# Patient Record
Sex: FEMALE | Race: WHITE | NOT HISPANIC OR LATINO | Employment: OTHER | URBAN - METROPOLITAN AREA
[De-identification: names, ages, dates, MRNs, and addresses within clinical notes are randomized per-mention and may not be internally consistent; named-entity substitution may affect disease eponyms.]

---

## 2017-11-29 ENCOUNTER — OFFICE VISIT (OUTPATIENT)
Dept: FAMILY MEDICINE | Facility: CLINIC | Age: 22
End: 2017-11-29

## 2017-11-29 VITALS
HEIGHT: 68 IN | SYSTOLIC BLOOD PRESSURE: 108 MMHG | TEMPERATURE: 98.6 F | DIASTOLIC BLOOD PRESSURE: 70 MMHG | HEART RATE: 90 BPM | OXYGEN SATURATION: 99 % | BODY MASS INDEX: 22.07 KG/M2 | WEIGHT: 145.6 LBS

## 2017-11-29 DIAGNOSIS — H61.21 IMPACTED CERUMEN OF RIGHT EAR: Primary | ICD-10-CM

## 2017-11-29 PROBLEM — Z76.89 HEALTH CARE HOME: Status: ACTIVE | Noted: 2017-11-29

## 2017-11-29 PROCEDURE — 99202 OFFICE O/P NEW SF 15 MIN: CPT | Performed by: PHYSICIAN ASSISTANT

## 2017-11-29 NOTE — MR AVS SNAPSHOT
After Visit Summary   11/29/2017    Emily Joan Schwab    MRN: [id number]           Patient Information     Date Of Birth          1995        Visit Information        Provider Department      11/29/2017 10:30 AM Joy Villa PA [facility]        Today's Diagnoses     Impacted cerumen of right ear    -  1       Follow-ups after your visit        Additional Services     OTOLARYNGOLOGY REFERRAL       Your provider has referred you to: N: [facility] [phone]   [email or web address]    Please be aware that coverage of these services is subject to the terms and limitations of your health insurance plan.  Call member services at your health plan with any benefit or coverage questions.      Please bring the following to your appointment:  >>   Any x-rays, CTs or MRIs which have been performed.  Contact the facility where they were done to arrange for  prior to your scheduled appointment.  Any new CT, MRI or other procedures ordered by your specialist must be performed at a [facility] or coordinated by your clinic's referral office.    >>   List of current medications   >>   This referral request   >>   Any documents/labs given to you for this referral                  Who to contact     If you have questions or need follow up information about today's clinic visit or your schedule please contact [facility] directly at [phone].  Normal or non-critical lab and imaging results will be communicated to you by MyChart, letter or phone within 4 business days after the clinic has received the results. If you do not hear from us within 7 days, please contact the clinic through MyChart or phone. If you have a critical or abnormal lab result, we will notify you by phone as soon as possible.  Submit refill requests through [employer] or call your pharmacy and they will forward the refill  "request to us. Please allow 3 business days for your refill to be completed.          Additional Information About Your Visit        MyChart Information     [employer] gives you secure access to your electronic health record. If you see a primary care provider, you can also send messages to your care team and make appointments. If you have questions, please call your primary care clinic.  If you do not have a primary care provider, please call [phone] and they will assist you.        Care EveryWhere ID     This is your Care EveryWhere ID. This could be used by other organizations to access your [place] medical records  [id number]        Your Vitals Were     Pulse Temperature Height Last Period Pulse Oximetry Breastfeeding?    90 98.6  F (37  C) (Oral) 1.715 m (5' 7.5\") 10/29/2017 99% No    BMI (Body Mass Index)                   22.47 kg/m2            Blood Pressure from Last 3 Encounters:   11/29/17 108/70    Weight from Last 3 Encounters:   11/29/17 66 kg (145 lb 9.6 oz)              We Performed the Following     OTOLARYNGOLOGY REFERRAL        Primary Care Provider Office Phone # Fax #    ZAINAB Rajput [phone]       [address]  [facility] [address]        Equal Access to Services     GARETH CASTREJON : Hadii aad ku hadasho Soomaali, waaxda luqadaha, qaybta kaalmada adeegyada, waxay idiin hayaan adeeg khsinai la'crista . So [facility] [phone].    ATENCIÓN: Si habla español, tiene a pathak disposición servicios gratuitos de asistencia lingüística. Llame al [phone].    We comply with applicable federal civil rights laws and [place] laws. We do not discriminate on the basis of race, color, national origin, age, disability, sex, sexual orientation, or gender identity.            Thank you!     Thank you for choosing [facility]  for your care. Our goal is always to provide you with excellent care. Hearing back from our patients is one way we can " continue to improve our services. Please take a few minutes to complete the written survey that you may receive in the mail after your visit with us. Thank you!             Your Updated Medication List - Protect others around you: Learn how to safely use, store and throw away your medicines at www.disposemymeds.org.      Notice  As of 11/29/2017 11:59 PM    You have not been prescribed any medications.

## 2017-11-29 NOTE — PROGRESS NOTES
"SUBJECTIVE:                                                    Emily Joan Schwab is a 22 year old female who presents to clinic today for the following health issues:    Lena presents with 2 weeks of right ear pain. She does have a hx of cerumen impaction in the past.  She has been using hydrogen peroxide and debrox. She notes decreased hearing in the ear. Denies discharge other than wax.  She has had slight rhinorrhea and sore throat but denies SOB, cough, fevers.      Pt is new to our clinic.    I have reviewed the following histories: Past Medical History, Past Surgical History, Social History, Family History, Problem List, Medication List and Allergies                BP Readings from Last 3 Encounters:   11/29/17 108/70    Wt Readings from Last 3 Encounters:   11/29/17 66 kg (145 lb 9.6 oz)            Patient Active Problem List   Diagnosis     Health Care Home     Past Surgical History:   Procedure Laterality Date     CA ANESTH,LOWER ARM SURGERY Left 2007    left elbow - trampoline accident     DENTAL SURGERY         Social History   Substance Use Topics     Smoking status: Never Smoker     Smokeless tobacco: Never Used     Alcohol use No     No family history on file.      No current outpatient prescriptions on file.       No Known Allergies    OBJECTIVE:                                                    /70 (BP Location: Left arm, Patient Position: Chair, Cuff Size: Adult Regular)  Pulse 90  Temp 98.6  F (37  C) (Oral)  Ht 1.715 m (5' 7.5\")  Wt 66 kg (145 lb 9.6 oz)  LMP 10/29/2017  SpO2 99%  Breastfeeding? No  BMI 22.47 kg/m2 Body mass index is 22.47 kg/(m^2).     GENERAL: alert and no distress  HEAD: Normocephalic, atraumatic  EYES: Eyes grossly normal to inspection, extraocular movements - intact  EARS:   Right: External ear normal.  Cerumen impacted    Hydrogen peroxide soak for 5 min  Ear flushed     Able to remove some but not all cerumen  TM not visualized    Left: External ear and " canal normal, TM normal  NOSE: No discharge noted  MOUTH/THROAT: no ulcers, no lesions, pharynx non-erythematous, no exudates present, tonsils without hypertrophy, mucous membranes moist.   NECK: no tenderness, no adenopathy, no asymmetry, no masses, no stiffness; thyroid- normal to palpation  RESP: lungs clear to auscultation - no crackles, no rhonchi, no wheezes  CV: regular rates and rhythm, normal S1 S2, no S3 or S4 and no murmur, no click or rub -         ASSESSMENT/PLAN:                                                        ICD-10-CM    1. Impacted cerumen of right ear H61.21 OTOLARYNGOLOGY REFERRAL     Debrox and Hydrogen Peroxide daily  ENT consult advised    Joy Villa PA-C  [facility]

## 2017-11-29 NOTE — NURSING NOTE
Lena has been having ear pain on her right side for a few weeks. Pt states her ear is plugged and the pain moves into her jaw area    Pre-Visit Screening :  Immunizations : not up to date - postponed flu shot  Asthma Action Test/Plan : NA  PHQ9/GAD7 :  NA    Pulse - regular  My Chart - accepts    CLASSIFICATION OF OVERWEIGHT AND OBESITY BY BMI                         Obesity Class           BMI(kg/m2)  Underweight                                    < 18.5  Normal                                         18.5-24.9  Overweight                                     25.0-29.9  OBESITY                     I                  30.0-34.9                              II                 35.0-39.9  EXTREME OBESITY             III                >40                             Patient's  BMI Body mass index is 22.47 kg/(m^2).  http://hin.nhlbi.nih.gov/menuplanner/menu.cgi  Questioned patient about current smoking habits.  Pt. has never smoked.  The patient has verbalized that it is ok to leave a detailed voice message on the patient's cell phone with results/recommendations from this visit.

## 2017-12-02 ENCOUNTER — HEALTH MAINTENANCE LETTER (OUTPATIENT)
Age: 22
End: 2017-12-02

## 2018-07-21 ENCOUNTER — HEALTH MAINTENANCE LETTER (OUTPATIENT)
Age: 23
End: 2018-07-21

## 2019-12-15 ENCOUNTER — HEALTH MAINTENANCE LETTER (OUTPATIENT)
Age: 24
End: 2019-12-15

## 2021-01-15 ENCOUNTER — HEALTH MAINTENANCE LETTER (OUTPATIENT)
Age: 26
End: 2021-01-15

## 2021-09-04 ENCOUNTER — HEALTH MAINTENANCE LETTER (OUTPATIENT)
Age: 26
End: 2021-09-04

## 2022-02-19 ENCOUNTER — HEALTH MAINTENANCE LETTER (OUTPATIENT)
Age: 27
End: 2022-02-19

## 2022-03-29 ENCOUNTER — TRANSFERRED RECORDS (OUTPATIENT)
Dept: HEALTH INFORMATION MANAGEMENT | Facility: CLINIC | Age: 27
End: 2022-03-29
Payer: COMMERCIAL

## 2022-04-11 ENCOUNTER — TRANSFERRED RECORDS (OUTPATIENT)
Dept: HEALTH INFORMATION MANAGEMENT | Facility: CLINIC | Age: 27
End: 2022-04-11

## 2022-04-11 ENCOUNTER — MEDICAL CORRESPONDENCE (OUTPATIENT)
Dept: HEALTH INFORMATION MANAGEMENT | Facility: CLINIC | Age: 27
End: 2022-04-11

## 2022-04-12 ENCOUNTER — TRANSCRIBE ORDERS (OUTPATIENT)
Dept: MATERNAL FETAL MEDICINE | Facility: CLINIC | Age: 27
End: 2022-04-12

## 2022-04-12 DIAGNOSIS — O26.90 PREGNANCY RELATED CONDITION: Primary | ICD-10-CM

## 2022-04-14 ENCOUNTER — TRANSCRIBE ORDERS (OUTPATIENT)
Dept: MATERNAL FETAL MEDICINE | Facility: CLINIC | Age: 27
End: 2022-04-14

## 2022-04-14 DIAGNOSIS — O09.90 HIGH-RISK PREGNANCY, UNSPECIFIED TRIMESTER: Primary | ICD-10-CM

## 2022-04-14 DIAGNOSIS — O26.90 PREGNANCY RELATED CONDITION, ANTEPARTUM: Primary | ICD-10-CM

## 2022-05-03 ENCOUNTER — PRE VISIT (OUTPATIENT)
Dept: MATERNAL FETAL MEDICINE | Facility: CLINIC | Age: 27
End: 2022-05-03
Payer: COMMERCIAL

## 2022-05-13 ENCOUNTER — HOSPITAL ENCOUNTER (OUTPATIENT)
Dept: ULTRASOUND IMAGING | Facility: CLINIC | Age: 27
Discharge: HOME | End: 2022-05-13
Attending: OBSTETRICS & GYNECOLOGY
Payer: COMMERCIAL

## 2022-05-13 ENCOUNTER — OFFICE VISIT (OUTPATIENT)
Dept: MATERNAL FETAL MEDICINE | Facility: CLINIC | Age: 27
End: 2022-05-13
Attending: OBSTETRICS & GYNECOLOGY
Payer: COMMERCIAL

## 2022-05-13 DIAGNOSIS — O09.90 HIGH-RISK PREGNANCY, UNSPECIFIED TRIMESTER: ICD-10-CM

## 2022-05-13 DIAGNOSIS — O26.90 PREGNANCY RELATED CONDITION, ANTEPARTUM: ICD-10-CM

## 2022-05-13 DIAGNOSIS — Z31.5 ENCOUNTER FOR PROCREATIVE GENETIC COUNSELING: Primary | ICD-10-CM

## 2022-05-13 DIAGNOSIS — Q79.60 EHLERS-DANLOS SYNDROME: ICD-10-CM

## 2022-05-13 PROCEDURE — 76813 OB US NUCHAL MEAS 1 GEST: CPT | Mod: 26 | Performed by: OBSTETRICS & GYNECOLOGY

## 2022-05-13 PROCEDURE — 96040 HC GENETIC COUNSELING, EACH 30 MINUTES: CPT | Performed by: GENETIC COUNSELOR, MS

## 2022-05-13 PROCEDURE — 76813 OB US NUCHAL MEAS 1 GEST: CPT

## 2022-05-13 PROCEDURE — 99203 OFFICE O/P NEW LOW 30 MIN: CPT | Mod: 25 | Performed by: OBSTETRICS & GYNECOLOGY

## 2022-05-13 NOTE — PROGRESS NOTES
"Please see \"Imaging\" tab under \"Chart Review\" for details of today's visit.    Nathaly Ventura MD PhD  Maternal Fetal Medicine     "

## 2022-05-13 NOTE — NURSING NOTE
Lena presents to [facility] for GC, NT, and MFM consult due to suspected EDS. Dr. Ventura met with patient. Pt is scheduled for L2 on 6/23/22.     Nathaly Ruiz RN

## 2022-05-13 NOTE — PROGRESS NOTES
[facility]  Genetic Counseling Consult    Patient: Lena Craft YOB: 1995   Date of Service: 22      Lena Craft was seen at [facility] for genetic consultation to discuss the options for screening and testing for fetal chromosome abnormalities.  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        Impression/Plan:   1.  Lena had a genetic counseling session today do discuss her personal and family medical histories of Dena Danlos Syndrome.  Lena's personal and family history of chronic joint pain and laxity may be consistent with EDS, in particular hypermobile EDS.  We discussed that genetic testing cannot identify an underlying cause for all types of EDS.      2.  Lena had an MFM consult today to discuss recommendations for prenatal management based on her personal medical history, please see corresponding documentation for complete details.  Because of the concern for severe/life threatening cardiac complications with certain types of EDS, a maternal echocardiogram may be considered.    3.  Lena declines screening for aneuploidy or other genetic conditions, and understands this remains available throughout pregnancy if desired.    Pregnancy History:   /Parity:    Age at Delivery: 27 year old  MONIE: 2022, by Ultrasound  Gestational Age: 13w4d    No significant complications or exposures were reported in the current pregnancy.    Lena s pregnancy history is significant for 1 prior first trimester SAB with no known cause.    Medical History:   Lena s reported medical history includes Dena Danlos syndrome (EDS).  Lena understands that today's appointment is not intended to confirm or rule out a diagnosis.  Lena's symptoms are per her report only and a physical exam was not a part of this appointment.  Lena reports  "significant joint laxity as well as chronic joint pain, both present since early childhood.  She reports multiple occurrences of rolled/sprained ankles, but denies any history of dislocations requiring medical care. Lena reports that her symptoms improve with periods of healthy life style/weight loss, and are exacerbated by weight gain.  She reports lax/stretchy skin, easy bruising, and lifelong fatigue as well.      Dena-Danlos syndrome (EDS) is a group of connective tissue disorders that affect the skin, bones, and blood vessels of many organs and tissues. There are 13 different types of EDS with six of the types being more common and the remaining increasingly rare. A few symptoms are common among most types of EDS including hypermobility and elastic and fragile skin. Further symptoms are unique or more common in some types. In the arthrochalasia type, for example, infants have hypermobility, hypotonia, and dislocations of hips at birth. Individuals with the classic type often have poor wound healing and have characteristic \"cigarette paper\" scars. Lastly, in the vascular type there is an increased risk for blood vessel rupture leading to life-threatening complications, especially during pregnancy when organ rupture is possible. Individuals with EDS often report chronic pain and systemic health problems like gastrointestinal issues and joint pain.     Mutations in 19 genes have been identified to cause the various Dena-Danlos syndromes. Most cases of the vascular type result from mutations in the COL3A1 gene, and rarely the COL1A1 gene. Genetic testing for the hypermobile type typically has low yield. Therefore, it is common for the hypermobile type to be clinically diagnosed. Treatment and management of the condition largely depends on the type and complications. Some individuals may need comprehensive care over multiple specialities.     The inheritance of EDS depends on the type. The classical, vascular, " arthrochalasia, periodontal, and likely the hypermobile type typically have an autosomal dominant pattern of inheritance. This means an affected individual has one copy of the causative gene with a mutation. An affected parent of an autosomal dominant condition has a 50% chance of passing on the gene to their child. Other types like the classic-like, cardiac-valvular, dermatosparaxis, kyphoscoliotic, spondylodysplastic, and musculocontractural types have an autosomal recessive pattern of inheritance.      Lena understands that genetic testing specifically for hypermobile type EDS would be expected to have a low yield, as the underlying genetic causes of hypermobile type EDS are not completely understood.  We discussed that based on her personal and family history, there is as high as a 50% chance for her children to inherit EDS from her.  Without a molecular cause, genetic testing of the pregnancy is not an option, and Lena was encouraged to discuss EDS with her child's pediatrician to ensure adequate evaluation and care in childhood.      Because of the concern for severe/life threatening cardiac complications with certain types of EDS, a maternal echocardiogram may be considered.         Family History:   A three-generation pedigree was obtained, and is scanned under the  Media  tab.   The following significant findings were reported by Lena:    Lena reports that her family history includes multiple individuals with EDS, all with symptoms similar to hers, specifically chronic joint pain, for her oldest sister, father, and 1 paternal uncle.  Her family history is consistent with an autosomal dominant inheritance pattern, with no symptoms that would be specifically suggestive of vascular type EDS or classic EDS.        Otherwise, the reported family history is negative for multiple miscarriages, stillbirths, birth defects, cognitive impairment, known genetic conditions, and consanguinity.       Carrier  Screening:       Expanded carrier screening for mutations in a large panel of genes associated with autosomal recessive conditions including cystic fibrosis, spinal muscular atrophy, and others, is now available.      Carrier screening was not discussed today.       Risk Assessment for Chromosome Conditions:   We explained that the risk for fetal chromosome abnormalities increases with maternal age. We discussed specific features of common chromosome abnormalities, including Down syndrome, trisomy 13, trisomy 18, and sex chromosome trisomies.      - At age 26 at midtrimester, the risk to have a baby with Down syndrome is 1 in 1087.    - At age 26 at midtrimester, the risk to have a baby with any chromosome abnormality is 1 in 544.     Lena has discussed aneuploidy and genetic screening with her primary OB already.  We briefly reviewed the topic today and Lena confirmed her decision to decline screening.       Testing Options:   We discussed the following options:   Non-invasive Prenatal Testing (NIPT)    Maternal plasma cell-free DNA testing; first trimester ultrasound with nuchal translucency and nasal bone assessment is recommended, when appropriate    Screens for fetal trisomy 21, trisomy 13, trisomy 18, and sex chromosome aneuploidy    Cannot screen for open neural tube defects; maternal serum AFP after 15 weeks is recommended       Genetic Amniocentesis    Invasive procedure typically performed in the second trimester by which amniotic fluid is obtained for the purpose of chromosome analysis and/or other prenatal genetic analysis    Diagnostic results; >99% sensitivity for fetal chromosome abnormalities    AFAFP measurement tests for open neural tube defects       Comprehensive (Level II) ultrasound: Detailed ultrasound performed between 18-22 weeks gestation to screen for major birth defects and markers for aneuploidy.        We reviewed the benefits and limitations of this testing.  Screening tests provide  a risk assessment specific to the pregnancy for certain fetal chromosome abnormalities, but cannot definitively diagnose or exclude a fetal chromosome abnormality.  Follow-up genetic counseling and consideration of diagnostic testing is recommended with any abnormal screening result.     Diagnostic tests carry inherent risks- including risk of miscarriage- that require careful consideration.  These tests can detect fetal chromosome abnormalities with greater than 99% certainty.  Results can be compromised by maternal cell contamination or mosaicism, and are limited by the resolution of cytogenetic G-banding technology.  There is no screening nor diagnostic test that can detect all forms of birth defects or mental disability.     It was a pleasure to be involved with [facility]. Face-to-face time of the meeting was 35 minutes.      Linus Pineda MS, [facility]  Licensed Genetic Counselor  Phone: [phone]  Pager: [phone]

## 2022-05-16 ENCOUNTER — TELEPHONE (OUTPATIENT)
Dept: INTERVENTIONAL RADIOLOGY/VASCULAR | Facility: CLINIC | Age: 27
End: 2022-05-16
Payer: COMMERCIAL

## 2022-06-20 ENCOUNTER — HOSPITAL ENCOUNTER (OUTPATIENT)
Dept: ULTRASOUND IMAGING | Facility: CLINIC | Age: 27
Discharge: HOME | End: 2022-06-20
Attending: OBSTETRICS & GYNECOLOGY | Admitting: OBSTETRICS & GYNECOLOGY
Payer: COMMERCIAL

## 2022-06-20 ENCOUNTER — OFFICE VISIT (OUTPATIENT)
Dept: MATERNAL FETAL MEDICINE | Facility: CLINIC | Age: 27
End: 2022-06-20
Attending: OBSTETRICS & GYNECOLOGY
Payer: COMMERCIAL

## 2022-06-20 ENCOUNTER — HOSPITAL ENCOUNTER (OUTPATIENT)
Dept: ULTRASOUND IMAGING | Facility: CLINIC | Age: 27
Discharge: HOME | End: 2022-06-20
Attending: OBSTETRICS & GYNECOLOGY
Payer: COMMERCIAL

## 2022-06-20 DIAGNOSIS — R22.1 LOCALIZED SWELLING, MASS AND LUMP, NECK: ICD-10-CM

## 2022-06-20 DIAGNOSIS — O09.90 HIGH-RISK PREGNANCY, UNSPECIFIED TRIMESTER: Primary | ICD-10-CM

## 2022-06-20 DIAGNOSIS — O09.90 HIGH-RISK PREGNANCY, UNSPECIFIED TRIMESTER: ICD-10-CM

## 2022-06-20 PROCEDURE — 88184 FLOWCYTOMETRY/ TC 1 MARKER: CPT | Performed by: STUDENT IN AN ORGANIZED HEALTH CARE EDUCATION/TRAINING PROGRAM

## 2022-06-20 PROCEDURE — 76811 OB US DETAILED SNGL FETUS: CPT

## 2022-06-20 PROCEDURE — 272N000431 US BIOPSY HEAD NECK THORAX SOFT TISSUE

## 2022-06-20 PROCEDURE — 88184 FLOWCYTOMETRY/ TC 1 MARKER: CPT | Performed by: OBSTETRICS & GYNECOLOGY

## 2022-06-20 PROCEDURE — 88305 TISSUE EXAM BY PATHOLOGIST: CPT | Mod: TC | Performed by: OBSTETRICS & GYNECOLOGY

## 2022-06-20 PROCEDURE — 88189 FLOWCYTOMETRY/READ 16 & >: CPT | Mod: GC | Performed by: STUDENT IN AN ORGANIZED HEALTH CARE EDUCATION/TRAINING PROGRAM

## 2022-06-20 PROCEDURE — 88185 FLOWCYTOMETRY/TC ADD-ON: CPT | Performed by: OBSTETRICS & GYNECOLOGY

## 2022-06-20 PROCEDURE — 250N000009 HC RX 250: Performed by: RADIOLOGY

## 2022-06-20 PROCEDURE — 76811 OB US DETAILED SNGL FETUS: CPT | Mod: 26 | Performed by: OBSTETRICS & GYNECOLOGY

## 2022-06-20 RX ADMIN — LIDOCAINE HYDROCHLORIDE 10 ML: 10 INJECTION, SOLUTION EPIDURAL; INFILTRATION; INTRACAUDAL; PERINEURAL at 14:24

## 2022-06-20 NOTE — PROCEDURES
Interventional Radiology Post-Procedure Note   ?   Brief Procedure Note:   Patient name: Lena Craft  Pt MRN:[id number]   Date of procedure: 6/20/2022     Procedure(s): Image Guided Biopsy of right supraclavicular lymph node  Sedation method: Moderate sedation was employed. The patient was monitored by an interventional radiology nurse at all times throughout the procedure under my direct guidance.  Pre Procedure Diagnosis: Enlarged lymp node  Post Procedure Diagnosis: Same  Indications: Need for pathologic evaluation   ?   Attending: Krystian Alex M.D.  Specimen(s) removed: four 18g core needle biopsy specimens   Additional studies ordered: None  Drains: None   Estimated Blood Loss: Minimal  Complications: None  Vascular closure method: None   Findings/Notes/Comments: Uncomplicated biopsy of right supraclavicular lymph node.   ?   Please see dictation in PACS or under the Imaging tab in [facility] for detailed procedure note.     Krystian Alex M.D.   Vascular and Interventional Radiology   Pager: [phone]   After Hours / Scheduling: [phone]     6/20/2022  3:26 PM

## 2022-06-20 NOTE — PROGRESS NOTES
Please refer to ultrasound report under 'Imaging' Studies of 'Chart Review' tabs.    Brayan Jiménez M.D.

## 2022-06-22 LAB
PATH REPORT.COMMENTS IMP SPEC: NORMAL
PATH REPORT.FINAL DX SPEC: NORMAL
PATH REPORT.FINAL DX SPEC: NORMAL
PATH REPORT.GROSS SPEC: NORMAL
PATH REPORT.MICROSCOPIC SPEC OTHER STN: NORMAL
PATH REPORT.MICROSCOPIC SPEC OTHER STN: NORMAL
PATH REPORT.RELEVANT HX SPEC: NORMAL
PHOTO IMAGE: NORMAL

## 2022-06-22 PROCEDURE — 88341 IMHCHEM/IMCYTCHM EA ADD ANTB: CPT | Mod: 26 | Performed by: PATHOLOGY

## 2022-06-22 PROCEDURE — 88305 TISSUE EXAM BY PATHOLOGIST: CPT | Mod: 26 | Performed by: PATHOLOGY

## 2022-06-22 PROCEDURE — 88342 IMHCHEM/IMCYTCHM 1ST ANTB: CPT | Mod: 26 | Performed by: PATHOLOGY

## 2022-08-23 ENCOUNTER — LAB REQUISITION (OUTPATIENT)
Dept: LAB | Facility: CLINIC | Age: 27
End: 2022-08-23
Payer: COMMERCIAL

## 2022-08-23 DIAGNOSIS — Z36.89 ENCOUNTER FOR OTHER SPECIFIED ANTENATAL SCREENING: ICD-10-CM

## 2022-08-23 DIAGNOSIS — Z01.83 ENCOUNTER FOR BLOOD TYPING: ICD-10-CM

## 2022-08-23 LAB
GLUCOSE 1H P 50 G GLC PO SERPL-MCNC: 95 MG/DL (ref 70–129)
HGB BLD-MCNC: 11.4 G/DL (ref 11.7–15.7)

## 2022-08-23 PROCEDURE — 82950 GLUCOSE TEST: CPT | Mod: ORL | Performed by: OBSTETRICS & GYNECOLOGY

## 2022-08-23 PROCEDURE — 86850 RBC ANTIBODY SCREEN: CPT | Mod: ORL | Performed by: OBSTETRICS & GYNECOLOGY

## 2022-08-23 PROCEDURE — 85018 HEMOGLOBIN: CPT | Mod: ORL | Performed by: OBSTETRICS & GYNECOLOGY

## 2022-08-23 PROCEDURE — 86592 SYPHILIS TEST NON-TREP QUAL: CPT | Mod: ORL | Performed by: OBSTETRICS & GYNECOLOGY

## 2022-08-24 LAB
ANTIBODY SCREEN: NEGATIVE
RPR SER QL: NONREACTIVE
SPECIMEN EXPIRATION DATE: NORMAL

## 2022-10-16 ENCOUNTER — HEALTH MAINTENANCE LETTER (OUTPATIENT)
Age: 27
End: 2022-10-16

## 2022-10-18 ENCOUNTER — LAB REQUISITION (OUTPATIENT)
Dept: LAB | Facility: CLINIC | Age: 27
End: 2022-10-18

## 2022-10-18 DIAGNOSIS — Z3A.36 36 WEEKS GESTATION OF PREGNANCY: ICD-10-CM

## 2022-10-18 PROCEDURE — 87653 STREP B DNA AMP PROBE: CPT | Performed by: OBSTETRICS & GYNECOLOGY

## 2022-10-19 LAB — GP B STREP DNA SPEC QL NAA+PROBE: NEGATIVE

## 2022-11-19 ENCOUNTER — HOSPITAL ENCOUNTER (INPATIENT)
Facility: CLINIC | Age: 27
LOS: 4 days | Discharge: HOME | End: 2022-11-23
Attending: OBSTETRICS & GYNECOLOGY | Admitting: OBSTETRICS & GYNECOLOGY
Payer: COMMERCIAL

## 2022-11-19 DIAGNOSIS — Z37.9 VACUUM-ASSISTED VAGINAL DELIVERY: Primary | ICD-10-CM

## 2022-11-19 LAB
ABO/RH(D): NORMAL
ANTIBODY SCREEN: NEGATIVE
SPECIMEN EXPIRATION DATE: NORMAL

## 2022-11-19 PROCEDURE — 86780 TREPONEMA PALLIDUM: CPT | Performed by: OBSTETRICS & GYNECOLOGY

## 2022-11-19 PROCEDURE — 86850 RBC ANTIBODY SCREEN: CPT | Performed by: OBSTETRICS & GYNECOLOGY

## 2022-11-19 PROCEDURE — 120N000001 HC R&B MED SURG/OB

## 2022-11-19 PROCEDURE — 86901 BLOOD TYPING SEROLOGIC RH(D): CPT | Performed by: OBSTETRICS & GYNECOLOGY

## 2022-11-19 PROCEDURE — 250N000013 HC RX MED GY IP 250 OP 250 PS 637: Performed by: OBSTETRICS & GYNECOLOGY

## 2022-11-19 RX ORDER — NALOXONE HYDROCHLORIDE 0.4 MG/ML
0.4 INJECTION, SOLUTION INTRAMUSCULAR; INTRAVENOUS; SUBCUTANEOUS
Status: DISCONTINUED | OUTPATIENT
Start: 2022-11-19 | End: 2022-11-21 | Stop reason: HOSPADM

## 2022-11-19 RX ORDER — METHYLERGONOVINE MALEATE 0.2 MG/ML
200 INJECTION INTRAVENOUS
Status: DISCONTINUED | OUTPATIENT
Start: 2022-11-19 | End: 2022-11-21 | Stop reason: HOSPADM

## 2022-11-19 RX ORDER — ONDANSETRON 2 MG/ML
4 INJECTION INTRAMUSCULAR; INTRAVENOUS EVERY 6 HOURS PRN
Status: DISCONTINUED | OUTPATIENT
Start: 2022-11-19 | End: 2022-11-21 | Stop reason: HOSPADM

## 2022-11-19 RX ORDER — MISOPROSTOL 200 UG/1
400 TABLET ORAL
Status: DISCONTINUED | OUTPATIENT
Start: 2022-11-19 | End: 2022-11-21 | Stop reason: HOSPADM

## 2022-11-19 RX ORDER — NALOXONE HYDROCHLORIDE 0.4 MG/ML
0.2 INJECTION, SOLUTION INTRAMUSCULAR; INTRAVENOUS; SUBCUTANEOUS
Status: DISCONTINUED | OUTPATIENT
Start: 2022-11-19 | End: 2022-11-21 | Stop reason: HOSPADM

## 2022-11-19 RX ORDER — MISOPROSTOL 100 UG/1
25 TABLET ORAL
Status: DISCONTINUED | OUTPATIENT
Start: 2022-11-19 | End: 2022-11-21 | Stop reason: HOSPADM

## 2022-11-19 RX ORDER — KETOROLAC TROMETHAMINE 30 MG/ML
30 INJECTION, SOLUTION INTRAMUSCULAR; INTRAVENOUS
Status: DISCONTINUED | OUTPATIENT
Start: 2022-11-19 | End: 2022-11-21

## 2022-11-19 RX ORDER — IBUPROFEN 800 MG/1
800 TABLET, FILM COATED ORAL
Status: DISCONTINUED | OUTPATIENT
Start: 2022-11-19 | End: 2022-11-21

## 2022-11-19 RX ORDER — OXYTOCIN 10 [USP'U]/ML
10 INJECTION, SOLUTION INTRAMUSCULAR; INTRAVENOUS
Status: DISCONTINUED | OUTPATIENT
Start: 2022-11-19 | End: 2022-11-21

## 2022-11-19 RX ORDER — MISOPROSTOL 200 UG/1
800 TABLET ORAL
Status: DISCONTINUED | OUTPATIENT
Start: 2022-11-19 | End: 2022-11-21 | Stop reason: HOSPADM

## 2022-11-19 RX ORDER — METOCLOPRAMIDE HYDROCHLORIDE 5 MG/ML
10 INJECTION INTRAMUSCULAR; INTRAVENOUS EVERY 6 HOURS PRN
Status: DISCONTINUED | OUTPATIENT
Start: 2022-11-19 | End: 2022-11-21 | Stop reason: HOSPADM

## 2022-11-19 RX ORDER — ONDANSETRON 4 MG/1
4 TABLET, ORALLY DISINTEGRATING ORAL EVERY 6 HOURS PRN
Status: DISCONTINUED | OUTPATIENT
Start: 2022-11-19 | End: 2022-11-21 | Stop reason: HOSPADM

## 2022-11-19 RX ORDER — PROCHLORPERAZINE 25 MG
25 SUPPOSITORY, RECTAL RECTAL EVERY 12 HOURS PRN
Status: DISCONTINUED | OUTPATIENT
Start: 2022-11-19 | End: 2022-11-21 | Stop reason: HOSPADM

## 2022-11-19 RX ORDER — TERBUTALINE SULFATE 1 MG/ML
0.25 INJECTION, SOLUTION SUBCUTANEOUS
Status: DISCONTINUED | OUTPATIENT
Start: 2022-11-19 | End: 2022-11-21 | Stop reason: HOSPADM

## 2022-11-19 RX ORDER — TRANEXAMIC ACID 10 MG/ML
1 INJECTION, SOLUTION INTRAVENOUS EVERY 30 MIN PRN
Status: DISCONTINUED | OUTPATIENT
Start: 2022-11-19 | End: 2022-11-21 | Stop reason: HOSPADM

## 2022-11-19 RX ORDER — CARBOPROST TROMETHAMINE 250 UG/ML
250 INJECTION, SOLUTION INTRAMUSCULAR
Status: DISCONTINUED | OUTPATIENT
Start: 2022-11-19 | End: 2022-11-21 | Stop reason: HOSPADM

## 2022-11-19 RX ORDER — DOXYLAMINE SUCCINATE AND PYRIDOXINE HYDROCHLORIDE 20; 20 MG/1; MG/1
1 TABLET, EXTENDED RELEASE ORAL 2 TIMES DAILY
Status: ON HOLD | COMMUNITY
Start: 2022-04-15 | End: 2022-11-23

## 2022-11-19 RX ORDER — OXYTOCIN/0.9 % SODIUM CHLORIDE 30/500 ML
100-340 PLASTIC BAG, INJECTION (ML) INTRAVENOUS CONTINUOUS PRN
Status: DISCONTINUED | OUTPATIENT
Start: 2022-11-19 | End: 2022-11-21

## 2022-11-19 RX ORDER — OXYTOCIN 10 [USP'U]/ML
10 INJECTION, SOLUTION INTRAMUSCULAR; INTRAVENOUS
Status: DISCONTINUED | OUTPATIENT
Start: 2022-11-19 | End: 2022-11-21 | Stop reason: HOSPADM

## 2022-11-19 RX ORDER — PROCHLORPERAZINE MALEATE 10 MG
10 TABLET ORAL EVERY 6 HOURS PRN
Status: DISCONTINUED | OUTPATIENT
Start: 2022-11-19 | End: 2022-11-21 | Stop reason: HOSPADM

## 2022-11-19 RX ORDER — CITRIC ACID/SODIUM CITRATE 334-500MG
30 SOLUTION, ORAL ORAL
Status: DISCONTINUED | OUTPATIENT
Start: 2022-11-19 | End: 2022-11-21 | Stop reason: HOSPADM

## 2022-11-19 RX ORDER — OXYTOCIN/0.9 % SODIUM CHLORIDE 30/500 ML
340 PLASTIC BAG, INJECTION (ML) INTRAVENOUS CONTINUOUS PRN
Status: DISCONTINUED | OUTPATIENT
Start: 2022-11-19 | End: 2022-11-21 | Stop reason: HOSPADM

## 2022-11-19 RX ORDER — METOCLOPRAMIDE 10 MG/1
10 TABLET ORAL EVERY 6 HOURS PRN
Status: DISCONTINUED | OUTPATIENT
Start: 2022-11-19 | End: 2022-11-21 | Stop reason: HOSPADM

## 2022-11-19 RX ADMIN — MISOPROSTOL 25 MCG: 100 TABLET ORAL at 23:25

## 2022-11-19 ASSESSMENT — ACTIVITIES OF DAILY LIVING (ADL)
WEAR_GLASSES_OR_BLIND: NO
WALKING_OR_CLIMBING_STAIRS_DIFFICULTY: NO
CHANGE_IN_FUNCTIONAL_STATUS_SINCE_ONSET_OF_CURRENT_ILLNESS/INJURY: NO
FALL_HISTORY_WITHIN_LAST_SIX_MONTHS: NO
CONCENTRATING,_REMEMBERING_OR_MAKING_DECISIONS_DIFFICULTY: NO
ADLS_ACUITY_SCORE: 18
TOILETING_ISSUES: NO
DIFFICULTY_EATING/SWALLOWING: NO
DRESSING/BATHING_DIFFICULTY: NO
DOING_ERRANDS_INDEPENDENTLY_DIFFICULTY: NO

## 2022-11-20 ENCOUNTER — ANESTHESIA EVENT (OUTPATIENT)
Dept: OBGYN | Facility: CLINIC | Age: 27
End: 2022-11-20
Payer: COMMERCIAL

## 2022-11-20 ENCOUNTER — ANESTHESIA (OUTPATIENT)
Dept: OBGYN | Facility: CLINIC | Age: 27
End: 2022-11-20
Payer: COMMERCIAL

## 2022-11-20 LAB
HOLD SPECIMEN: NORMAL
SARS-COV-2 RNA RESP QL NAA+PROBE: NEGATIVE
T PALLIDUM AB SER QL: NONREACTIVE

## 2022-11-20 PROCEDURE — U0005 INFEC AGEN DETEC AMPLI PROBE: HCPCS | Performed by: OBSTETRICS & GYNECOLOGY

## 2022-11-20 PROCEDURE — 250N000011 HC RX IP 250 OP 636: Performed by: OBSTETRICS & GYNECOLOGY

## 2022-11-20 PROCEDURE — 3E0R3BZ INTRODUCTION OF ANESTHETIC AGENT INTO SPINAL CANAL, PERCUTANEOUS APPROACH: ICD-10-PCS | Performed by: ANESTHESIOLOGY

## 2022-11-20 PROCEDURE — 250N000009 HC RX 250: Performed by: OBSTETRICS & GYNECOLOGY

## 2022-11-20 PROCEDURE — 120N000001 HC R&B MED SURG/OB

## 2022-11-20 PROCEDURE — 250N000011 HC RX IP 250 OP 636: Performed by: ANESTHESIOLOGY

## 2022-11-20 PROCEDURE — 258N000003 HC RX IP 258 OP 636: Performed by: OBSTETRICS & GYNECOLOGY

## 2022-11-20 PROCEDURE — 00HU33Z INSERTION OF INFUSION DEVICE INTO SPINAL CANAL, PERCUTANEOUS APPROACH: ICD-10-PCS | Performed by: ANESTHESIOLOGY

## 2022-11-20 PROCEDURE — 250N000013 HC RX MED GY IP 250 OP 250 PS 637: Performed by: OBSTETRICS & GYNECOLOGY

## 2022-11-20 PROCEDURE — 370N000003 HC ANESTHESIA WARD SERVICE

## 2022-11-20 RX ORDER — OXYTOCIN 10 [USP'U]/ML
10 INJECTION, SOLUTION INTRAMUSCULAR; INTRAVENOUS
Status: DISCONTINUED | OUTPATIENT
Start: 2022-11-20 | End: 2022-11-21

## 2022-11-20 RX ORDER — NALOXONE HYDROCHLORIDE 0.4 MG/ML
0.4 INJECTION, SOLUTION INTRAMUSCULAR; INTRAVENOUS; SUBCUTANEOUS
Status: DISCONTINUED | OUTPATIENT
Start: 2022-11-20 | End: 2022-11-21 | Stop reason: HOSPADM

## 2022-11-20 RX ORDER — HYDROXYZINE HYDROCHLORIDE 25 MG/1
25 TABLET, FILM COATED ORAL EVERY 6 HOURS PRN
Status: COMPLETED | OUTPATIENT
Start: 2022-11-20 | End: 2022-11-20

## 2022-11-20 RX ORDER — KETOROLAC TROMETHAMINE 30 MG/ML
30 INJECTION, SOLUTION INTRAMUSCULAR; INTRAVENOUS
Status: DISCONTINUED | OUTPATIENT
Start: 2022-11-20 | End: 2022-11-21

## 2022-11-20 RX ORDER — METOCLOPRAMIDE HYDROCHLORIDE 5 MG/ML
10 INJECTION INTRAMUSCULAR; INTRAVENOUS EVERY 6 HOURS PRN
Status: DISCONTINUED | OUTPATIENT
Start: 2022-11-20 | End: 2022-11-21 | Stop reason: HOSPADM

## 2022-11-20 RX ORDER — BUPIVACAINE HYDROCHLORIDE 2.5 MG/ML
INJECTION, SOLUTION EPIDURAL; INFILTRATION; INTRACAUDAL PRN
Status: DISCONTINUED | OUTPATIENT
Start: 2022-11-20 | End: 2022-11-21

## 2022-11-20 RX ORDER — CARBOPROST TROMETHAMINE 250 UG/ML
250 INJECTION, SOLUTION INTRAMUSCULAR
Status: DISCONTINUED | OUTPATIENT
Start: 2022-11-20 | End: 2022-11-21 | Stop reason: HOSPADM

## 2022-11-20 RX ORDER — AMPICILLIN 2 G/1
2 INJECTION, POWDER, FOR SOLUTION INTRAVENOUS EVERY 6 HOURS
Status: DISCONTINUED | OUTPATIENT
Start: 2022-11-20 | End: 2022-11-21 | Stop reason: HOSPADM

## 2022-11-20 RX ORDER — NALOXONE HYDROCHLORIDE 0.4 MG/ML
0.2 INJECTION, SOLUTION INTRAMUSCULAR; INTRAVENOUS; SUBCUTANEOUS
Status: DISCONTINUED | OUTPATIENT
Start: 2022-11-20 | End: 2022-11-21 | Stop reason: HOSPADM

## 2022-11-20 RX ORDER — OXYTOCIN 10 [USP'U]/ML
10 INJECTION, SOLUTION INTRAMUSCULAR; INTRAVENOUS
Status: DISCONTINUED | OUTPATIENT
Start: 2022-11-20 | End: 2022-11-21 | Stop reason: HOSPADM

## 2022-11-20 RX ORDER — ONDANSETRON 2 MG/ML
4 INJECTION INTRAMUSCULAR; INTRAVENOUS EVERY 6 HOURS PRN
Status: DISCONTINUED | OUTPATIENT
Start: 2022-11-20 | End: 2022-11-21 | Stop reason: HOSPADM

## 2022-11-20 RX ORDER — METOCLOPRAMIDE 10 MG/1
10 TABLET ORAL EVERY 6 HOURS PRN
Status: DISCONTINUED | OUTPATIENT
Start: 2022-11-20 | End: 2022-11-21 | Stop reason: HOSPADM

## 2022-11-20 RX ORDER — PROCHLORPERAZINE 25 MG
25 SUPPOSITORY, RECTAL RECTAL EVERY 12 HOURS PRN
Status: DISCONTINUED | OUTPATIENT
Start: 2022-11-20 | End: 2022-11-21 | Stop reason: HOSPADM

## 2022-11-20 RX ORDER — SODIUM CHLORIDE, SODIUM LACTATE, POTASSIUM CHLORIDE, CALCIUM CHLORIDE 600; 310; 30; 20 MG/100ML; MG/100ML; MG/100ML; MG/100ML
INJECTION, SOLUTION INTRAVENOUS CONTINUOUS PRN
Status: DISCONTINUED | OUTPATIENT
Start: 2022-11-20 | End: 2022-11-21 | Stop reason: HOSPADM

## 2022-11-20 RX ORDER — MISOPROSTOL 200 UG/1
400 TABLET ORAL
Status: DISCONTINUED | OUTPATIENT
Start: 2022-11-20 | End: 2022-11-21 | Stop reason: HOSPADM

## 2022-11-20 RX ORDER — IBUPROFEN 800 MG/1
800 TABLET, FILM COATED ORAL
Status: DISCONTINUED | OUTPATIENT
Start: 2022-11-20 | End: 2022-11-21

## 2022-11-20 RX ORDER — HYDROXYZINE HYDROCHLORIDE 50 MG/1
100 TABLET, FILM COATED ORAL EVERY 6 HOURS PRN
Status: COMPLETED | OUTPATIENT
Start: 2022-11-20 | End: 2022-11-20

## 2022-11-20 RX ORDER — TRANEXAMIC ACID 10 MG/ML
1 INJECTION, SOLUTION INTRAVENOUS EVERY 30 MIN PRN
Status: DISCONTINUED | OUTPATIENT
Start: 2022-11-20 | End: 2022-11-21 | Stop reason: HOSPADM

## 2022-11-20 RX ORDER — LIDOCAINE 40 MG/G
CREAM TOPICAL
Status: DISCONTINUED | OUTPATIENT
Start: 2022-11-20 | End: 2022-11-21 | Stop reason: HOSPADM

## 2022-11-20 RX ORDER — OXYTOCIN/0.9 % SODIUM CHLORIDE 30/500 ML
100-340 PLASTIC BAG, INJECTION (ML) INTRAVENOUS CONTINUOUS PRN
Status: DISCONTINUED | OUTPATIENT
Start: 2022-11-20 | End: 2022-11-23 | Stop reason: HOSPADM

## 2022-11-20 RX ORDER — OXYTOCIN/0.9 % SODIUM CHLORIDE 30/500 ML
340 PLASTIC BAG, INJECTION (ML) INTRAVENOUS CONTINUOUS PRN
Status: DISCONTINUED | OUTPATIENT
Start: 2022-11-20 | End: 2022-11-21 | Stop reason: HOSPADM

## 2022-11-20 RX ORDER — NALBUPHINE HYDROCHLORIDE 10 MG/ML
2.5-5 INJECTION, SOLUTION INTRAMUSCULAR; INTRAVENOUS; SUBCUTANEOUS EVERY 6 HOURS PRN
Status: DISCONTINUED | OUTPATIENT
Start: 2022-11-20 | End: 2022-11-23 | Stop reason: HOSPADM

## 2022-11-20 RX ORDER — OXYTOCIN/0.9 % SODIUM CHLORIDE 30/500 ML
1-24 PLASTIC BAG, INJECTION (ML) INTRAVENOUS CONTINUOUS
Status: DISCONTINUED | OUTPATIENT
Start: 2022-11-20 | End: 2022-11-21 | Stop reason: HOSPADM

## 2022-11-20 RX ORDER — CITRIC ACID/SODIUM CITRATE 334-500MG
30 SOLUTION, ORAL ORAL
Status: DISCONTINUED | OUTPATIENT
Start: 2022-11-20 | End: 2022-11-21 | Stop reason: HOSPADM

## 2022-11-20 RX ORDER — EPHEDRINE SULFATE 50 MG/ML
5 INJECTION, SOLUTION INTRAMUSCULAR; INTRAVENOUS; SUBCUTANEOUS
Status: DISCONTINUED | OUTPATIENT
Start: 2022-11-20 | End: 2022-11-21 | Stop reason: HOSPADM

## 2022-11-20 RX ORDER — ACETAMINOPHEN 325 MG/1
975 TABLET ORAL EVERY 6 HOURS
Status: DISCONTINUED | OUTPATIENT
Start: 2022-11-20 | End: 2022-11-21 | Stop reason: HOSPADM

## 2022-11-20 RX ORDER — METHYLERGONOVINE MALEATE 0.2 MG/ML
200 INJECTION INTRAVENOUS
Status: DISCONTINUED | OUTPATIENT
Start: 2022-11-20 | End: 2022-11-21 | Stop reason: HOSPADM

## 2022-11-20 RX ORDER — MISOPROSTOL 200 UG/1
800 TABLET ORAL
Status: DISCONTINUED | OUTPATIENT
Start: 2022-11-20 | End: 2022-11-21 | Stop reason: HOSPADM

## 2022-11-20 RX ORDER — ONDANSETRON 4 MG/1
4 TABLET, ORALLY DISINTEGRATING ORAL EVERY 6 HOURS PRN
Status: DISCONTINUED | OUTPATIENT
Start: 2022-11-20 | End: 2022-11-21 | Stop reason: HOSPADM

## 2022-11-20 RX ORDER — PROCHLORPERAZINE MALEATE 10 MG
10 TABLET ORAL EVERY 6 HOURS PRN
Status: DISCONTINUED | OUTPATIENT
Start: 2022-11-20 | End: 2022-11-21 | Stop reason: HOSPADM

## 2022-11-20 RX ORDER — FENTANYL CITRATE 50 UG/ML
100 INJECTION, SOLUTION INTRAMUSCULAR; INTRAVENOUS
Status: DISCONTINUED | OUTPATIENT
Start: 2022-11-20 | End: 2022-11-21 | Stop reason: HOSPADM

## 2022-11-20 RX ADMIN — MISOPROSTOL 25 MCG: 100 TABLET ORAL at 01:31

## 2022-11-20 RX ADMIN — SODIUM CHLORIDE, POTASSIUM CHLORIDE, SODIUM LACTATE AND CALCIUM CHLORIDE: 600; 310; 30; 20 INJECTION, SOLUTION INTRAVENOUS at 19:51

## 2022-11-20 RX ADMIN — Medication: at 17:57

## 2022-11-20 RX ADMIN — FENTANYL CITRATE 100 MCG: 50 INJECTION, SOLUTION INTRAMUSCULAR; INTRAVENOUS at 14:12

## 2022-11-20 RX ADMIN — HYDROXYZINE HYDROCHLORIDE 100 MG: 50 TABLET, FILM COATED ORAL at 06:45

## 2022-11-20 RX ADMIN — SODIUM CHLORIDE, POTASSIUM CHLORIDE, SODIUM LACTATE AND CALCIUM CHLORIDE 1000 ML: 600; 310; 30; 20 INJECTION, SOLUTION INTRAVENOUS at 17:43

## 2022-11-20 RX ADMIN — FENTANYL CITRATE 100 MCG: 50 INJECTION, SOLUTION INTRAMUSCULAR; INTRAVENOUS at 15:21

## 2022-11-20 RX ADMIN — FENTANYL CITRATE 100 MCG: 50 INJECTION, SOLUTION INTRAMUSCULAR; INTRAVENOUS at 10:30

## 2022-11-20 RX ADMIN — MISOPROSTOL 25 MCG: 100 TABLET ORAL at 03:53

## 2022-11-20 RX ADMIN — Medication: at 21:43

## 2022-11-20 RX ADMIN — Medication 2 MILLI-UNITS/MIN: at 10:15

## 2022-11-20 RX ADMIN — BUPIVACAINE HYDROCHLORIDE 10 ML: 2.5 INJECTION, SOLUTION EPIDURAL; INFILTRATION; INTRACAUDAL at 20:52

## 2022-11-20 ASSESSMENT — ACTIVITIES OF DAILY LIVING (ADL)
ADLS_ACUITY_SCORE: 18
ADLS_ACUITY_SCORE: 22
ADLS_ACUITY_SCORE: 18
ADLS_ACUITY_SCORE: 22
ADLS_ACUITY_SCORE: 18

## 2022-11-20 NOTE — CARE PLAN
Dr. Lima returned call. Updated her on patient status and that patient would like to avoid Pitocin. TORB to hold any further IOL processes until Dr. Swenson can discuss options with patient (Leela will be rounding later this morning).  Requested medications for therapeutic sleep. TORB for vistaril 100mg PO.

## 2022-11-20 NOTE — ANESTHESIA PROCEDURE NOTES
Epidural catheter Procedure Note    Pre-Procedure   Staff -        Performed By: Anesthesiologist  Procedure Documentation  Procedure: epidural catheter   Comments:  Pre-Procedure  Performed by Garland Segura MD  Location: OB.      PreAnesthestic Checklist: patient identified, IV checked, risks and benefits discussed, informed consent obtained, monitors and equipment checked, pre-op evaluation and at physician/surgeon's request.    Timeout   Correct Patient: Yes  Correct Procedure: Epidural catheter placement  Correct Site: Yes   Correct Position: Yes    Procedure Documentation  Procedure:   Epidural catheter block for Labor    Patient currently in labor and she and OBMD request a labor epidural to control her labor pains. Patient was interviewed and examined. Procedure and risks including but not limited to bleeding, infection, nerve injury, paralysis, PDPH, and inadequate block requiring intervention discussed with patient. Questions answered. This epidural is to be placed in anticipation of vaginal delivery.  She consents to the epidural procedure.  Time-out was performed.  I or my partners remain immediately available for management of any issues or complications and will monitor at appropriate intervals.  Procedure: Patient sitting. Betadine prep x 3. Sterile drape applied.  Mask and gloves used.  Lidocaine 1%  local infiltration at L 3-4.  17 G. Tuohy needle at L3-4 by loss of resistance into epidural space.  No CSF, paresthesia or blood. 1.5 % Lidocaine with 1:200,000 Epinephrine 5cc test dose. Epidural catheter inserted w/o resistance to 5 cm in epidural space. Then 0.125% bupivicaine with fentanyl 2 mcg/ml 12 cc with NS 5 cc.  Aspiration negative for blood and CSF.   Negative for neuro change, paresthesia or symptoms of intravascular injection or intrathecal injection.  Infusion orders written and infusion of 0.125% bupivicaine with fentanyl 2 mcg/ml at 10cc per hour started.    Garland Segura MD           FOR  "[facility] ONLY:   Pain Team Contact information: please page the Pain Team [employer] Search \"Pain\". During daytime hours, please page the attending first. At night please page the resident first.    "

## 2022-11-20 NOTE — PROGRESS NOTES
[facility]  Labor Progress Note    S: Patient feeling contractions that are more intense Q 6-8 min .    O:   Patient Vitals for the past 4 hrs:   BP Temp Temp src Resp   22 0832 119/61 98.1  F (36.7  C) Oral 16   22 0721 117/71 98.1  F (36.7  C) Oral 16   22 0530 -- 98  F (36.7  C) Oral --     SVE: 3/80/-1/posterior     FHT: Baseline 130, moderate variability, + accelerations, no decelerations  [place]: Contractions Q 2-7 min     A/P: 27 year old  at 40w6d admitted for elective IOL.  Prenatal course complicated by possible Ehrlers danlos - normal level II ultrasound     Labor: s/p cytotec x 3, SROM at 0145 am on 2022, now expectant management. No change since this morning despite contractions. Would recommend pitocin augmentation, agrees to this  FWB: Category 1 FHT  GBS: negative  Rh: negative  Pain: per patient preference. Desires fentanyl right now. Does not want an epidural.       MD Rasheed Barroso [facility]  2022, 9:15 AM

## 2022-11-20 NOTE — H&P
"OB Brief Admit H&P    No significant change in general health status based on examination of the patient, review of Nursing Admission Database and prenatal record.    Pt is a 27 year old  @ 40w5d who presented to L&D for planned postdates IOL.     Patient's prenatal course has been complicated by possible Dena Danlos (father has) had normal level II US. She did receive Tdap in pregnancy.    Prenatal Labs:    Blood type A neg  Rubella immune  GCT 95  GBS negative    EFW: 7-7.5    Temp 98.2  F (36.8  C) (Oral)   Resp 16   Ht 1.702 m (5' 7\")   BMI 22.80 kg/m    EFM: baseline 140 bpm, moderate variability, accels present, no decels  [place]: irregular  SVE: 270/-2  Membranes: intact    Assessment:  27 year old  @ 40w5d admitted for eIOL.    - Had question regarding timing - is listed at 40w6d in our clinic system but 1st tri US supports MONIE  making her 40w5d.  However bishops score is 8 currently so does have option to keep for elective IOL, L&D able to accommodate and patient strongly desires.  Do believe however that she may benefit from some cervical ripening.    Cervical ripening: PO cytotec  - AROM/pit when able    FWB: Category I, reactive  - Continuous fetal monitoring    Prenatal:   GBS negative  - s/p Tdap    Dayanna Lima MD  2022  11:02 PM      "

## 2022-11-20 NOTE — PROVIDER NOTIFICATION
11/20/22 1530   Provider Notification   Provider Name/Title Dr. Gonzalez   Method of Notification Phone   Notification Reason Status Update  (updated of pt request to go in shower, pit currently at 12mu, pt also just got Fent and fht's minimal, otherwise fht's have been cat1 tracing.)     Per dr gonzalez, its ok for pt to go in shower with mahnaz monitor, no increasing pit, once we get a cat1 tracing, for 30 mins.

## 2022-11-20 NOTE — PLAN OF CARE
0715: Assumed cares on patient. Report received from CARLA Westbrook.   0951: Dr. Swenson here to see pt. SVE done. SVE 3/80/-1. Orders to start pitocin. Orders also for pt to receive Fentanyl IV PRN for pain.   1017: Pitocin started.   1030: 100mcg of Fentanyl given to patient for pain. Will continue to monitor.  1425: Dr. Swenson called for an update. Orders to check SVE. SVE 4/90/-1. Orders to continue pitocin and to recheck cervix at 1930 unless indicated sooner.   1510: Report given to CARLA Canales who assumes all cares.   Shayna Cr RN on 11/20/2022 at 7:55 AM

## 2022-11-20 NOTE — PLAN OF CARE
Data: [facility] presented to [facility]: 2022 10:01 PM.  Patient admitted for induction for postdates. Patient is a .  Prenatal record reviewed. Pregnancy has been uncomplicated..  Gestational Age 40w5d. VSS. Fetal movement active. Patient denies uterine contractions, leaking of vaginal fluid/rupture of membranes, vaginal bleeding, abdominal pain, pelvic pressure, nausea, vomiting, headache, visual disturbances, epigastric or URQ pain, significant edema. Support person is present.   Action: Verbal consent for EFM.  Admission assessment completed. Bill of rights reviewed.  Response: Patient verbalized agreement with plan. Will contact Dr Dayanna Lima with update and further orders.

## 2022-11-20 NOTE — PROGRESS NOTES
Page not returned after 15 minutes. Called  [facility] [place] L&D looking for Dr. Lima.     Spoke to RN on SD unit. Dr. Lima is currently in a delivery. Relayed message to RN requesting call back. Need to discuss IOL plan moving forward. Patient SROM'd, LAU is 9, does not want Pitocin. Currently ike every 4-6 minutes; palpate moderate. FHT's category 1.    Patient is willing to do therapeutic rest if Dr. Lima is willing to order. Patient has had no rest overnight.

## 2022-11-21 LAB
ABO/RH(D): NORMAL
BASOPHILS # BLD AUTO: 0 10E3/UL (ref 0–0.2)
BASOPHILS NFR BLD AUTO: 0 %
CREAT SERPL-MCNC: 0.73 MG/DL (ref 0.51–0.95)
EOSINOPHIL # BLD AUTO: 0 10E3/UL (ref 0–0.7)
EOSINOPHIL NFR BLD AUTO: 0 %
ERYTHROCYTE [DISTWIDTH] IN BLOOD BY AUTOMATED COUNT: 13.7 % (ref 10–15)
FETAL BLEED SCREEN: NORMAL
GFR SERPL CREATININE-BSD FRML MDRD: >90 ML/MIN/1.73M2
HCT VFR BLD AUTO: 34.2 % (ref 35–47)
HGB BLD-MCNC: 11.5 G/DL (ref 11.7–15.7)
IMM GRANULOCYTES # BLD: 0.2 10E3/UL
IMM GRANULOCYTES NFR BLD: 1 %
LYMPHOCYTES # BLD AUTO: 1.2 10E3/UL (ref 0.8–5.3)
LYMPHOCYTES NFR BLD AUTO: 8 %
MCH RBC QN AUTO: 31.2 PG (ref 26.5–33)
MCHC RBC AUTO-ENTMCNC: 33.6 G/DL (ref 31.5–36.5)
MCV RBC AUTO: 93 FL (ref 78–100)
MONOCYTES # BLD AUTO: 1.3 10E3/UL (ref 0–1.3)
MONOCYTES NFR BLD AUTO: 9 %
NEUTROPHILS # BLD AUTO: 12.4 10E3/UL (ref 1.6–8.3)
NEUTROPHILS NFR BLD AUTO: 82 %
NRBC # BLD AUTO: 0 10E3/UL
NRBC BLD AUTO-RTO: 0 /100
PLATELET # BLD AUTO: 279 10E3/UL (ref 150–450)
RBC # BLD AUTO: 3.69 10E6/UL (ref 3.8–5.2)
SPECIMEN EXPIRATION DATE: NORMAL
WBC # BLD AUTO: 15.1 10E3/UL (ref 4–11)

## 2022-11-21 PROCEDURE — 36415 COLL VENOUS BLD VENIPUNCTURE: CPT | Performed by: OBSTETRICS & GYNECOLOGY

## 2022-11-21 PROCEDURE — 258N000003 HC RX IP 258 OP 636: Performed by: OBSTETRICS & GYNECOLOGY

## 2022-11-21 PROCEDURE — 120N000001 HC R&B MED SURG/OB

## 2022-11-21 PROCEDURE — 722N000001 HC LABOR CARE VAGINAL DELIVERY SINGLE

## 2022-11-21 PROCEDURE — 82565 ASSAY OF CREATININE: CPT | Performed by: OBSTETRICS & GYNECOLOGY

## 2022-11-21 PROCEDURE — 85025 COMPLETE CBC W/AUTO DIFF WBC: CPT | Performed by: OBSTETRICS & GYNECOLOGY

## 2022-11-21 PROCEDURE — 250N000011 HC RX IP 250 OP 636: Performed by: OBSTETRICS & GYNECOLOGY

## 2022-11-21 PROCEDURE — 250N000013 HC RX MED GY IP 250 OP 250 PS 637: Performed by: OBSTETRICS & GYNECOLOGY

## 2022-11-21 PROCEDURE — 999N000080 HC STATISTIC IP LACTATION SERVICES 16-30 MIN

## 2022-11-21 PROCEDURE — 86901 BLOOD TYPING SEROLOGIC RH(D): CPT | Performed by: OBSTETRICS & GYNECOLOGY

## 2022-11-21 PROCEDURE — 0KQM0ZZ REPAIR PERINEUM MUSCLE, OPEN APPROACH: ICD-10-PCS | Performed by: OBSTETRICS & GYNECOLOGY

## 2022-11-21 PROCEDURE — 88307 TISSUE EXAM BY PATHOLOGIST: CPT | Mod: TC | Performed by: OBSTETRICS & GYNECOLOGY

## 2022-11-21 RX ORDER — IBUPROFEN 800 MG/1
800 TABLET, FILM COATED ORAL EVERY 6 HOURS PRN
Status: DISCONTINUED | OUTPATIENT
Start: 2022-11-21 | End: 2022-11-23 | Stop reason: HOSPADM

## 2022-11-21 RX ORDER — AMPICILLIN 2 G/1
2 INJECTION, POWDER, FOR SOLUTION INTRAVENOUS ONCE
Status: DISCONTINUED | OUTPATIENT
Start: 2022-11-21 | End: 2022-11-22

## 2022-11-21 RX ORDER — MISOPROSTOL 200 UG/1
400 TABLET ORAL
Status: DISCONTINUED | OUTPATIENT
Start: 2022-11-21 | End: 2022-11-23 | Stop reason: HOSPADM

## 2022-11-21 RX ORDER — DOCUSATE SODIUM 100 MG/1
100 CAPSULE, LIQUID FILLED ORAL DAILY
Status: DISCONTINUED | OUTPATIENT
Start: 2022-11-21 | End: 2022-11-23 | Stop reason: HOSPADM

## 2022-11-21 RX ORDER — CARBOPROST TROMETHAMINE 250 UG/ML
250 INJECTION, SOLUTION INTRAMUSCULAR
Status: DISCONTINUED | OUTPATIENT
Start: 2022-11-21 | End: 2022-11-23 | Stop reason: HOSPADM

## 2022-11-21 RX ORDER — OXYTOCIN 10 [USP'U]/ML
10 INJECTION, SOLUTION INTRAMUSCULAR; INTRAVENOUS
Status: DISCONTINUED | OUTPATIENT
Start: 2022-11-21 | End: 2022-11-23 | Stop reason: HOSPADM

## 2022-11-21 RX ORDER — OXYTOCIN/0.9 % SODIUM CHLORIDE 30/500 ML
340 PLASTIC BAG, INJECTION (ML) INTRAVENOUS CONTINUOUS PRN
Status: DISCONTINUED | OUTPATIENT
Start: 2022-11-21 | End: 2022-11-23 | Stop reason: HOSPADM

## 2022-11-21 RX ORDER — BISACODYL 10 MG
10 SUPPOSITORY, RECTAL RECTAL DAILY PRN
Status: DISCONTINUED | OUTPATIENT
Start: 2022-11-21 | End: 2022-11-23 | Stop reason: HOSPADM

## 2022-11-21 RX ORDER — MISOPROSTOL 200 UG/1
800 TABLET ORAL
Status: DISCONTINUED | OUTPATIENT
Start: 2022-11-21 | End: 2022-11-23 | Stop reason: HOSPADM

## 2022-11-21 RX ORDER — TRANEXAMIC ACID 10 MG/ML
1 INJECTION, SOLUTION INTRAVENOUS EVERY 30 MIN PRN
Status: DISCONTINUED | OUTPATIENT
Start: 2022-11-21 | End: 2022-11-23 | Stop reason: HOSPADM

## 2022-11-21 RX ORDER — MODIFIED LANOLIN
OINTMENT (GRAM) TOPICAL
Status: DISCONTINUED | OUTPATIENT
Start: 2022-11-21 | End: 2022-11-23 | Stop reason: HOSPADM

## 2022-11-21 RX ORDER — ACETAMINOPHEN 325 MG/1
650 TABLET ORAL EVERY 4 HOURS PRN
Status: DISCONTINUED | OUTPATIENT
Start: 2022-11-21 | End: 2022-11-23 | Stop reason: HOSPADM

## 2022-11-21 RX ORDER — HYDROCORTISONE 25 MG/G
CREAM TOPICAL 3 TIMES DAILY PRN
Status: DISCONTINUED | OUTPATIENT
Start: 2022-11-21 | End: 2022-11-23 | Stop reason: HOSPADM

## 2022-11-21 RX ORDER — METHYLERGONOVINE MALEATE 0.2 MG/ML
200 INJECTION INTRAVENOUS
Status: DISCONTINUED | OUTPATIENT
Start: 2022-11-21 | End: 2022-11-23 | Stop reason: HOSPADM

## 2022-11-21 RX ADMIN — GENTAMICIN SULFATE 160 MG: 40 INJECTION, SOLUTION INTRAMUSCULAR; INTRAVENOUS at 08:27

## 2022-11-21 RX ADMIN — ACETAMINOPHEN 650 MG: 325 TABLET, FILM COATED ORAL at 17:39

## 2022-11-21 RX ADMIN — AMPICILLIN 2 G: 2 INJECTION, POWDER, FOR SOLUTION INTRAVENOUS at 00:04

## 2022-11-21 RX ADMIN — ACETAMINOPHEN 650 MG: 325 TABLET, FILM COATED ORAL at 07:35

## 2022-11-21 RX ADMIN — HUMAN RHO(D) IMMUNE GLOBULIN 300 MCG: 1500 SOLUTION INTRAMUSCULAR; INTRAVENOUS at 21:37

## 2022-11-21 RX ADMIN — IBUPROFEN 800 MG: 800 TABLET, FILM COATED ORAL at 15:58

## 2022-11-21 RX ADMIN — ACETAMINOPHEN 975 MG: 325 TABLET ORAL at 00:03

## 2022-11-21 RX ADMIN — GENTAMICIN SULFATE 160 MG: 40 INJECTION, SOLUTION INTRAMUSCULAR; INTRAVENOUS at 00:30

## 2022-11-21 RX ADMIN — ACETAMINOPHEN 650 MG: 325 TABLET, FILM COATED ORAL at 12:56

## 2022-11-21 RX ADMIN — ACETAMINOPHEN 650 MG: 325 TABLET, FILM COATED ORAL at 21:36

## 2022-11-21 ASSESSMENT — ACTIVITIES OF DAILY LIVING (ADL)
ADLS_ACUITY_SCORE: 18
ADLS_ACUITY_SCORE: 18
ADLS_ACUITY_SCORE: 22
ADLS_ACUITY_SCORE: 18
ADLS_ACUITY_SCORE: 22
ADLS_ACUITY_SCORE: 18
ADLS_ACUITY_SCORE: 22
ADLS_ACUITY_SCORE: 18

## 2022-11-21 NOTE — PROVIDER NOTIFICATION
11/20/22 7889   Vaginal Exam   Method sterile exam per RN   Vaginal Bleeding bloody show   Cervical Consistency soft   Cervical Dilation (cm) 9   Cervical Effacement %   Fetal Station -1     Forebag felt, cervical edge felt all the way around, -1 station and with contraction baby comes down to about 0 station.

## 2022-11-21 NOTE — PROVIDER NOTIFICATION
11/20/22 4919   Provider Notification   Provider Name/Title Dr. Swenson   Method of Notification Phone   Request Evaluate - Remote   Notification Reason Status Update;Maternal Vital Sign Change;SVE     Updated provider on patient status. Last SVE was 9/100/0. Recent maternal vital sign change - temperature was 100.8 and 101.0 thirty min following. FHT present with a now tachycardic baseline 165-170s.     MD following triple I criteria and is diagnosing patient with triple I at this time. Orders to be put in for triple I, ampicillin and gentamycin to be administered soon. RN will update provider with SVE in about 15 min. No further orders.

## 2022-11-21 NOTE — PROVIDER NOTIFICATION
11/20/22 2049   Provider Notification   Provider Name/Title CATHY Sheridan   Method of Notification At Bedside   Notification Reason Pain;Patient Request     MDA at bedside for epidural rebolus. Will check back to see how patient is feeling.     2115: CATHY asking for update - patient now comfortable with epidural again.

## 2022-11-21 NOTE — PROVIDER NOTIFICATION
11/21/22 0025   Provider Notification   Provider Name/Title Dr. Swenson   Method of Notification At Bedside     Provider at bedside. Updated last SVE: complete/100/0 at 0021. Setting up bed for practice pushing. RN and MD reviewed T.

## 2022-11-21 NOTE — PROVIDER NOTIFICATION
11/20/22 2125   Provider Notification   Provider Name/Title Dr. Swenson   Method of Notification Phone   Request Evaluate - Remote   Notification Reason Status Update;SVE;Uterine Activity     Updated provider: SVE 8/100/0. Pt ike every 3-5 min, pitocin currently infusing at 18. FHT category I, moderate variability, accels, and intermittent early decels - occasional periods of minimal variability. Patient asking if it's okay to take her daily Bonjesta (nausea med) at 2200, 20 mg PO.     MD okay with patient taking her Bonjesta around 2200. RN will continue to update as needed.

## 2022-11-21 NOTE — PROGRESS NOTES
Called at 11:07 pm for fetal tachycardia and maternal temp x 3 around 11pm. Start amp/gent. Got tylenol po. Tachycardia started around 11pm, 180, isolated variable decels, mod variability.    On arrival to hospital, patient complete, forebag with SROM clear fluid.   Pushed with patient in multiple positions. Good maternal effort noted    Currently -180 baseline, no accels, variable decels, mod variability    Anticipate vaginal delivery   NICU to be at delivery    Tomasa Swenson MD  11/21/2022  1:14 AM

## 2022-11-21 NOTE — PLAN OF CARE
Data: Lena Craft transferred to [facility] via wheelchair at 0650. Baby transferred via parent's arms.  Action: Receiving unit notified of transfer: Yes. Patient and family notified of room change. Report given to Елена AUSTIN RN at 0715. Belongings sent to receiving unit. Accompanied by Registered Nurse. Oriented patient to surroundings. Call light within reach. ID bands double-checked with receiving RN.  Response: Patient tolerated transfer and is stable.    Patients mobililty level scored using the bedside mobility assistance tool (BMAT). Patient is at a mobility level test number: 4. Mobility equipment used: wheelchair. Required assist of 0 staff members. Further use of BMAT scoring not required.

## 2022-11-21 NOTE — PROVIDER NOTIFICATION
11/20/22 4043   Provider Notification   Provider Name/Title Dr. Swenson   Method of Notification Electronic Page     2330- Phone: 9/100/-1, 0 station with contraction. FHT- intermittent variables and a prolonged decel. RN repositioned patient shortly afterwards and continuously watching FHT at this time to see observe if intermittent decels are becoming recurrent. MD will be arriving at hospital in 20-25 min.     Text page: SVE 9.5/100/0 with anterior lip left. Administrating patient's antibiotics for triple I diagnosis shortly.

## 2022-11-21 NOTE — PROVIDER NOTIFICATION
11/20/22 4415   Provider Notification   Provider Name/Title Dr. gonzalez   Method of Notification Electronic Page;Phone   Notification Reason Status Update;SVE  (updated of pt getting epidural, sve 5-6/100/0, forebag palpated, contrx q2-4min, fht's cat 1 tracing w/int early decel noted. pit @14mu/hr. will keep dr updated.)

## 2022-11-21 NOTE — PLAN OF CARE
Data: Vital signs within normal limits.Afebrile this morning, IV antibioiotics completed. Postpartum checks within normal limits - see flow record. Patient eating and drinking normally. Patient able to empty bladder independently and is up ambulating. Using ice packs for perineal discomfort  Patient performing self cares and is able to care for infant.Patient does have a significant red rash on abdomen no complaints of itching.  Action: Patient medicated during the shift for pain. See MAR. Patient reassessed within 1 hour after each medication and pain was improved - patient stated she was comfortable. Patient education done about  See flow record.  Response: Positive attachment behaviors observed with infant. Support persons spouse present.   Plan: Anticipate discharge on PPD #2  11 am CARLA malcolm

## 2022-11-21 NOTE — L&D DELIVERY NOTE
Delivery Summary    Lena Craft MRN# [id number]   Age: 27 year old YOB: 1995     ASSESSMENT & PLAN:       OB Vacuum assisted Vaginal Delivery Note      HPI:  Pt is a 36 year old  @ 39w5d who presented to L&D on 22 for eIOL.            Prenatal labs:  A negative, antibody screen: negative, Rubella Immune,  Hep B/HIV/RPR all negative, GC/CT negative, GCT passed, GBS negative    Pregnancy complications:    1.Possible Dena Danlos, normal level II, no need for maternal or fetal echo    Hospital Course:    First Stage: On admission, contractions were irregular and patient was 2cm dilated. FHTs were in the 140s with accelerations present. moderate variability,  no decelerations noted.   Abdomen was non-tender.  EFW was 7.5# by Leopold's.  Patient's labor was induced with po cytotec.    At the patient's request, she received epidural  analgesia.  She did receive pitocin for augmentation of labor, to a maximum of 18mu/min.  Spontaneous ROM occurred at 0145 on  with clear fluid noted.  Patient reached complete cervical dilation at 0021 on 2022.    Second Stage:  Patient did not  labor down , and began pushing at 0029.  Good maternal expulsive efforts were noted.  Fetal heart tones remained reassuring during the second stage.  Baseline 160, with min/mod variability, recurrent variable decelerations noted.      Due to maternal exhaustion and prolonged second stage, recommendation to proceed with a vacuum delivery was explained to patient.      Vacuum Assisted Vaginal Delivery  performed using Kiwi vacuum. EFW 7.5#. Fetal presentation:  Cephalic.  Fetal position:  OA.  Fetal station: +3.  Verbal informed consent obtained.  Alternatives to vacuum discussed.  Risks of vacuum delivery discussed including risk of cephalohematoma, subgaleal hemorrhage, epidural hemorrhage, intercranial hemorrhage and maternal perineal laceration. Patient gave verbal consent to proceed.  NICU and OR alerted  that vacuum extraction planned.  Patient's bladder drained 75ml.  Vacuum applied over saggital suture, 3cm anterior to posterior fontanelle.  Vacuum applied  x 1.  Pulls:  1 contraction (3 pulls).  Pop-offs 0.  Total time vacuum applied 1minute.  Maximum pressure used 500 mmHg. Good descent of fetal vertex with each contraction.  Vacuum removed after fetal vertex brought to a full crown.  Remainder of infant delivered without complication.      A  nuchal cord was not present.  A female infant was then delivered without complications at 0357 on 2022.  The mouth and nares were bulb suctioned.  The cord was clamped after delayed cord clamping, cut and the infant was placed on maternal abdomen.  The infant's weight was 7 pounds 12 ounces.  Apgars were 9 and 9 at one and five minutes .         Third Stage:  The placenta then delivered at 0400 .  It was noted to be intact with a three vessel cord.  The patient's perineum was inspected and a second degree perineal laceration and bilateral labial lacerations noted.  2nd degree was repaired in the usual fashion. The right labial was repaired in a running, non locked stitch with 4-0 vicryl. A figure of 8 with 4-0 vicryl placed on left labial. There was a cosmo-urethral at midline that was hemostatic and not repaired.   EBL for the procedure was 168ml.    Sponge and needle counts were correct.  The patient and infant remained in the delivery suite following delivery in stable condition.      Tomasa Swenson MD  2022  4:33 AM         Steve Craft [[id number]]    Labor Event Times    Dilation complete date: 22 Complete time: 12:21 AM   Start pushing date/time: 2022 0029      Labor Length    2nd Stage (hrs): 3 (min): 36   3rd Stage (hrs): 0 (min): 3      Labor Events     labor?: No   steroids: None  Labor Type: Induction/Cervical ripening  Predominate monitoring during 1st stage: continuous electronic fetal monitoring      Antibiotics received during labor?: Yes  Reason for Antibiotics: Chorioamnionitis  Antibiotics given for Chorioamnionitis: Ampicillin/Gentamicin     Rupture identifier: Sac 1  Rupture date/time: 22 0145   Rupture type: Spontaneous rupture of membranes occuring during spontaneous labor or augmentation  Fluid color: Clear     Induction: Oxytocin, AROM, Misoprostol  Induction date/time:     Cervical ripening date/time: 22 2330   Indications for induction: Elective     Augmentation: Oxytocin, AROM  Indications for augmentation: Ineffective Contraction Pattern  1:1 continuous labor support provided by?: RN       Delivery/Placenta Date and Time    Delivery Date: 22 Delivery Time:  3:57 AM   Placenta Date/Time: 2022  4:00 AM  Oxytocin given at the time of delivery: after delivery of baby  Delivering clinician: Tomasa Swenson MD   Other personnel present at delivery:  Provider Role   Nai Price RN Registered Nurse   Laurie Leach RN Registered Nurse         Vaginal Counts     Initial count performed by 2 team members:  Two Team Members   Chayo Obrien       Needles Suture Needles Sponges (RETIRED) Instruments   Initial counts 2  5    Added to count  2     Relief counts       Final counts             Placed during labor Accounted for at the end of labor   FSE No NA   IUPC No NA   Cervidil No NA                     Apgars    Living status: Living   1 Minute 5 Minute 10 Minute 15 Minute 20 Minute   Skin color: 1  1       Heart rate: 2  2       Reflex irritability: 2  2       Muscle tone: 2  2       Respiratory effort: 2  2       Total: 9  9       Apgars assigned by: LOIDA LAUGHLIN     Cord    Cord Complications: None               Cord Blood Disposition: Lab    Gases Sent?: Yes    Delayed cord clamping?: Yes    Cord Clamping Delay (seconds):  seconds    Stem cell collection?: No       [place] Resuscitation    Methods: None   Care at Delivery: Called by  dr Swenson to the delivery for vacuum extraction and chorioamnionitis  Millan Assessment Tool Data    Gestational Age:  Information for the patient's mother: Lena Craft [[id number]]  41w0d    Maternal temperature range:  Information for the patient's mother: Lena Craft [[id number]]  Temp  Av  F (37.2  C)  Min: 97.8  F (36.6  C)  Max: 101  F (38.3  C)    Membranes ruptured for:   Information for the patient's mother: Lena Craft [[id number]]  26h 12m     GBS status (Does NOT pull positives in urine ONLY):  Information for the patient's mother: Lena Craft [[id number]]  Lab Results       Component                Value               Date                        GBPCRT                   Negative            10/18/2022              Antibiotic Status:  Antibiotics received for GBS    Antibiotic given (GBS)    Antibiotics given for Chorioamnionitis Ampicillin/Gentamicin  Antibiotics given (Chorioamnionitis)    Additional Management    Fetal Tracing Prior to  Delivery    Fetal Tracing Comments      Determination based on clinical exam after birth:  Based on the examination this is a Well Appearing infant.    Blood culture obtained: NO, per EOS calculator     Sepsis Calculator: [email or web address]    Millan Score, PRELIMINARY: 0.83    Millan Score, FINAL: 0.34    Disposition:  To stay with Shweta Wallace, ADAMARIS-CNP 2022 4:17 AM     [place] Measurements    Weight: 7 lb 10.1 oz       Skin to Skin and Feeding Plan    Skin to skin initiation date/time: 1841    Skin to skin with: Mother  Skin to skin end date/time:        Labor Events and Shoulder Dystocia    Fetal Tracing Prior to Delivery: Category 2  Shoulder dystocia present?: Neg     Delivery (Maternal) (Provider to Complete) ([id number])    Episiotomy: None  Perineal lacerations: 2nd Repaired?: Yes   Periurethral laceration: bilateral Repaired?: No   Labial  laceration: bilateral Repaired?: Yes   Repair suture: 3-0 Vicryl, 4-0 Vicryl  Number of repair packets: 2  Genital tract inspection done: Pos     Blood Loss  Mother: Lena Craft #[id number]   Start of Mother's Information    Delivery Blood Loss  11/20/22 1557 - 11/21/22 0442    Delivery QBL (mL) Hospital Encounter 168 mL    Total  168 mL         End of Mother's Information  Mother: Lena Craft #[id number]          Delivery - Provider to Complete ([id number])    Delivering clinician: Tomasa Swenson MD  Attempted Delivery Types (Choose all that apply): Spontaneous Vaginal Delivery  Delivery Type (Choose the 1 that will go to the Birth History): Vaginal, Vacuum (Extractor)    Verbal Informed Consent Obtained For Vacuum: Yes Alternate Labor Strategies Discussed (vacuum): Yes    Emergency Resources Available (vacuum): Charge Nurse/Team, Resuscitation Team   Type (vacuum): kiwi       # of Pop-Offs (vacuum): 0 # of Pulls/Contractions (vacuum): 1   Position (vacuum): OA Fetal Station (vacuum): +3      Indication for Operative Vaginal Delivery (vacuum): Prolonged 2nd Stage, Maternal Exhaustion            Other personnel:  Provider Role   Nai Price, RN Registered Nurse   Laurie Leach RN Registered Nurse                Placenta    Date/Time: 11/21/2022  4:00 AM  Removal: Spontaneous  Disposition: Pathology           Anesthesia    Method: INTRAVENOUS , Epidural  Cervical dilation at placement: 4-7                Presentation and Position    Presentation: Vertex    Position: Right Occiput Anterior                 Tomasa Swenson MD

## 2022-11-21 NOTE — LACTATION NOTE
Lactation in to see patient. Baby at breast at time of visit. Lena breastfeeding infant fully swaddled. Baby does not have as much breast tissue in the mouth. Patient did not initially want to un swaddle infant. Discussed reason why it is beneficial. Pointed out pinched tip of nipple.  Baby spit up colostrum with burping. Switch to cross cradle. With assistance baby got a deeper latch, flanged lips. Swallows heard. Baby content looking after feeding. Lena feeling well educated. Reviewed feeding frequently, STS, fist 24 hour feeding behaviors and beyond. Baby has bruising on head from vacuum. Educated on jaundice. Has a pump for home. Encouraged to call for assistance with feeds or questions.

## 2022-11-22 LAB
HGB BLD-MCNC: 10.3 G/DL (ref 11.7–15.7)
PATH REPORT.COMMENTS IMP SPEC: NORMAL
PATH REPORT.COMMENTS IMP SPEC: NORMAL
PATH REPORT.FINAL DX SPEC: NORMAL
PATH REPORT.GROSS SPEC: NORMAL
PATH REPORT.MICROSCOPIC SPEC OTHER STN: NORMAL
PATH REPORT.RELEVANT HX SPEC: NORMAL
PHOTO IMAGE: NORMAL

## 2022-11-22 PROCEDURE — 88307 TISSUE EXAM BY PATHOLOGIST: CPT | Mod: 26 | Performed by: PATHOLOGY

## 2022-11-22 PROCEDURE — 120N000001 HC R&B MED SURG/OB

## 2022-11-22 PROCEDURE — 250N000013 HC RX MED GY IP 250 OP 250 PS 637: Performed by: OBSTETRICS & GYNECOLOGY

## 2022-11-22 PROCEDURE — 36415 COLL VENOUS BLD VENIPUNCTURE: CPT | Performed by: OBSTETRICS & GYNECOLOGY

## 2022-11-22 PROCEDURE — 999N000079 HC STATISTIC IP LACTATION SERVICES 1-15 MIN

## 2022-11-22 PROCEDURE — 85018 HEMOGLOBIN: CPT | Performed by: OBSTETRICS & GYNECOLOGY

## 2022-11-22 RX ADMIN — ACETAMINOPHEN 650 MG: 325 TABLET, FILM COATED ORAL at 02:09

## 2022-11-22 RX ADMIN — ACETAMINOPHEN 650 MG: 325 TABLET, FILM COATED ORAL at 10:55

## 2022-11-22 RX ADMIN — ACETAMINOPHEN 650 MG: 325 TABLET, FILM COATED ORAL at 14:57

## 2022-11-22 RX ADMIN — IBUPROFEN 800 MG: 800 TABLET, FILM COATED ORAL at 14:57

## 2022-11-22 RX ADMIN — ACETAMINOPHEN 650 MG: 325 TABLET, FILM COATED ORAL at 07:00

## 2022-11-22 RX ADMIN — ACETAMINOPHEN 650 MG: 325 TABLET, FILM COATED ORAL at 23:03

## 2022-11-22 RX ADMIN — IBUPROFEN 800 MG: 800 TABLET, FILM COATED ORAL at 22:57

## 2022-11-22 RX ADMIN — IBUPROFEN 800 MG: 800 TABLET, FILM COATED ORAL at 07:00

## 2022-11-22 RX ADMIN — ACETAMINOPHEN 650 MG: 325 TABLET, FILM COATED ORAL at 18:47

## 2022-11-22 RX ADMIN — IBUPROFEN 800 MG: 800 TABLET, FILM COATED ORAL at 00:39

## 2022-11-22 ASSESSMENT — ACTIVITIES OF DAILY LIVING (ADL)
ADLS_ACUITY_SCORE: 18

## 2022-11-22 NOTE — PLAN OF CARE
VSS. Up ad yo. Voiding without difficulty. Lochia scant, no clots. Fundus firm and midline. Pain managed with tylenol & ibuprofen.  Breast feeding, tolerating well and using mother love cream. Rhogam given. IV removed. FOB supportive and at bedside. Bonding well with infant.

## 2022-11-22 NOTE — PLAN OF CARE
VSS on room air. Fundus/lochia WDL. Voiding appropriately and ambulating independently. Pain adequately controlled with scheduled and PRN medications. Breastfeeding infant independently q2-3hr. EBM given to infant as well. Pt declined pumping at this time.  at bedside, supportive. Positive bonding and attachment with infant observed.     Depression screen and birth certificate have been completed.    Amber Robert RN on 11/22/2022 at 6:02 PM

## 2022-11-22 NOTE — LACTATION NOTE
Lactation in to follow up. Patient states baby had a hard time overnight with feeds due to increasing nasal congestion. Did some hand expressing and eyad feeding. Baby active cueing and vigorously sucking on the pacifier. Discussed these are signs that baby is ready to feed. Offered assistance with getting infant to breast. Patient states her mother is a postpartum nurse and is on her way to hospital and will help with a feed, so declined assistance. Knows lactation available.

## 2022-11-22 NOTE — PLAN OF CARE
Data: Vital signs within normal limits. Postpartum checks within normal limits - see flow record. Patient eating and drinking normally. Patient able to empty bladder independently and is up ambulating.  Patient performing self cares and is able to care for infant.    Action: Patient medicated during the shift for vaginal/perineum pain, See MAR, using ice and tuck pads on and off. Patient reassessed within 1 hour after each medication and pain was improved - patient stated she was comfortable. Patient education done about infant safety, EBM finger feeding, and s/s of respiratory distress in infants. See flow record.    Response: Positive attachment behaviors observed with infant. Breast feeding and finger feeding EBM. Support persons is present. Birth cert completed tonight.     Plan: Anticipate discharge on 11/23

## 2022-11-22 NOTE — PROGRESS NOTES
"OB Post-partum Note  PPD# 1    S:  Patient doing well.  Pain controlled.  Voiding.  Bleeding is normal.  Breast feeding.    O:  /76 (BP Location: Left arm, Patient Position: Sitting)   Pulse 96   Temp 98.5  F (36.9  C) (Oral)   Resp 16   Ht 1.702 m (5' 7\")   Wt 81.2 kg (179 lb)   SpO2 (!) 70%   Breastfeeding yes   BMI 28.04 kg/m    Gen- A&O, NAD  Abd- Non-tender, fundus non tender and firm   Ext- non-tender, no calf pain    Hemoglobin   Date Value Ref Range Status   2022 10.3 (L) 11.7 - 15.7 g/dL Final     A NEG  - received rhogam  Rubella Immune  covid neg    A/P: 27 year old  PPD# 1 s/p VAVD, labor complicated by triple I - afebrile since delivery.    1.  Routine post-partum cares  2.  Analgesia tylenol and ibuprofen  3.  Discharge tomorrow - patient expressed interest in discharge today but due to triple I during labor, baby will need to stay until tomorrow.  4.  The plan of care was discussed with the patient.  She expressed understanding and agreement  5. Received Tdap during pregnancy      Ava Amaral MD  2022  8:09 AM  "

## 2022-11-23 VITALS
RESPIRATION RATE: 18 BRPM | SYSTOLIC BLOOD PRESSURE: 117 MMHG | HEIGHT: 67 IN | WEIGHT: 179 LBS | HEART RATE: 78 BPM | BODY MASS INDEX: 28.09 KG/M2 | TEMPERATURE: 98 F | OXYGEN SATURATION: 70 % | DIASTOLIC BLOOD PRESSURE: 69 MMHG

## 2022-11-23 PROCEDURE — 250N000013 HC RX MED GY IP 250 OP 250 PS 637: Performed by: OBSTETRICS & GYNECOLOGY

## 2022-11-23 RX ADMIN — ACETAMINOPHEN 650 MG: 325 TABLET, FILM COATED ORAL at 13:10

## 2022-11-23 RX ADMIN — ACETAMINOPHEN 650 MG: 325 TABLET, FILM COATED ORAL at 03:03

## 2022-11-23 RX ADMIN — IBUPROFEN 800 MG: 800 TABLET, FILM COATED ORAL at 13:10

## 2022-11-23 RX ADMIN — ACETAMINOPHEN 650 MG: 325 TABLET, FILM COATED ORAL at 08:41

## 2022-11-23 RX ADMIN — BENZOCAINE: 11.4 AEROSOL, SPRAY TOPICAL at 08:41

## 2022-11-23 RX ADMIN — IBUPROFEN 800 MG: 800 TABLET, FILM COATED ORAL at 06:00

## 2022-11-23 ASSESSMENT — ACTIVITIES OF DAILY LIVING (ADL)
ADLS_ACUITY_SCORE: 18

## 2022-11-23 NOTE — PROGRESS NOTES
"OB Post-partum Note  PPD# 2    S:  Patient doing well.  Pain controlled.  Voiding and has had bowel movements.  Bleeding is normal.  Breast feeding.    O:  /61 (BP Location: Left arm, Patient Position: Semi-Galindo's, Cuff Size: Adult Regular)   Pulse 72   Temp 98.7  F (37.1  C) (Oral)   Resp 18   Ht 1.702 m (5' 7\")   Wt 81.2 kg (179 lb)   SpO2 (!) 70%   Breastfeeding Unknown   BMI 28.04 kg/m    Gen- A&O, NAD  Abd- Non-tender, fundus non tender and firm   Ext- non-tender, no calf pain    Hemoglobin   Date Value Ref Range Status   2022 10.3 (L) 11.7 - 15.7 g/dL Final     A negative  Rubella Immune    A/P: 27 year old  PPD# 1 s/p VAVD, labor complicated by triple I - afebrile since delivery. Patient stayed over last night for Peds to monitor the baby for an extended duration.  Baby doing very well today, ready for discharge.      1.  Routine post-partum cares  2.  Analgesia tylenol and ibuprofen  3.  Discharge today  4.  The plan of care was discussed with the patient.  She expressed understanding and agreement  5. Received Tdap during pregnancy  6.  Rh negative blood type, received Rhogam  7.  Return for a routine post partum exam in 6 weeks  8.  Indications to call or return were discussed.     Ciro Pate MD  2022  8:26 AM  "

## 2022-11-23 NOTE — PLAN OF CARE
VSS. Up ad yo. Voiding without difficulty. Lochia scant, no clots. Fundus firm and midline. Pain well managed with Tylenol and Ibuprofen. Breastfeeding, tolerating well. Using mother's love.  FOB supportive and at bedside. Bonding well with infant.

## 2022-11-23 NOTE — DISCHARGE INSTRUCTIONS
Postpartum Vaginal Delivery Instructions  Follow up in 6 weeks for Postpartum visit.    Activity     Ask family and friends for help when you need it.  Do not place anything in your vagina for 6 weeks.  You are not restricted on other activities, but take it easy for a few weeks to allow your body to recover from delivery.  You are able to do any activities you feel up to that point.  No driving until you have stopped taking your pain medications (usually two weeks after delivery).     Call your health care provider if you have any of these symptoms:     Increased pain, swelling, redness, or fluid around your stiches from an episiotomy or perineal tear.  A fever above 100.4 F (38 C) with or without chills when placing a thermometer under your tongue.  You soak a sanitary pad with blood within 1 hour, or you see blood clots larger than a golf ball.  Bleeding that lasts more than 6 weeks.  Vaginal discharge that smells bad.  Severe pain, cramping or tenderness in your lower belly area.  A need to urinate more frequently (use the toilet more often), more urgently (use the toilet very quickly), or it burns when you urinate.  Nausea and vomiting.  Redness, swelling or pain around a vein in your leg.  Problems breastfeeding or a red or painful area on your breast.  Chest pain and cough or are gasping for air.  Problems coping with sadness, anxiety, or depression.  If you have any concerns about hurting yourself or the baby, call your provider immediately.   You have questions or concerns after you return home.     Keep your hands clean:  Always wash your hands before touching your perineal area and stitches.  This helps reduce your risk of infection.  If your hands aren't dirty, you may use an alcohol hand-rub to clean your hands. Keep your nails clean and short.

## 2022-11-23 NOTE — PLAN OF CARE
All discharge instructions were reviewed with patient by writer and all questions answered. Patient left unit with infant and all belonging sat 1340.

## 2022-11-23 NOTE — PLAN OF CARE
Patient meeting expected goals. Is up independent in room, meeting all personal and infant needs. VSS. Breastfeeding is going well and bonding well with infant. Encouraged on demand feedings and monitoring feeding cues. Pain is being managed with Ibuprofen and Tylenol. Also using tucks and ice packs for perineum discomfort. Plan for discharge to home later today. Patient is aware to follow up in clinic in 6 weeks for postpartum visit.

## 2022-12-05 NOTE — ANESTHESIA PREPROCEDURE EVALUATION
Anesthesia Pre-Procedure Evaluation    Patient: Lena Craft   MRN: [id number] : 1995        Procedure : * No procedures listed *          Past Medical History:   Diagnosis Date     Miscarriage       Past Surgical History:   Procedure Laterality Date     [facility] ANESTH,LOWER ARM SURGERY Left 2007    left elbow - trampoline accident     DENTAL SURGERY        No Known Allergies   Social History     Tobacco Use     Smoking status: Never     Smokeless tobacco: Never   Substance Use Topics     Alcohol use: No      Wt Readings from Last 1 Encounters:   22 81.2 kg (179 lb)        Anesthesia Evaluation   Pt has had prior anesthetic.         ROS/MED HX  ENT/Pulmonary:  - neg pulmonary ROS     Neurologic:       Cardiovascular:  - neg cardiovascular ROS     METS/Exercise Tolerance:     Hematologic:       Musculoskeletal:       GI/Hepatic:       Renal/Genitourinary:       Endo:       Psychiatric/Substance Use:       Infectious Disease:       Malignancy:       Other:            Physical Exam    Airway        Mallampati: III   TM distance: > 3 FB   Neck ROM: full     Respiratory Devices and Support         Dental           Cardiovascular             Pulmonary                   OUTSIDE LABS:  CBC:   Lab Results   Component Value Date    WBC 15.1 (H) 2022    HGB 10.3 (L) 2022    HGB 11.5 (L) 2022    HCT 34.2 (L) 2022     2022     BMP:   Lab Results   Component Value Date    CR 0.73 2022     COAGS: No results found for: PTT, INR, FIBR  POC: No results found for: BGM, HCG, HCGS  HEPATIC: No results found for: ALBUMIN, PROTTOTAL, ALT, AST, GGT, ALKPHOS, BILITOTAL, BILIDIRECT, JHON  OTHER: No results found for: PH, LACT, A1C, LISA, PHOS, MAG, LIPASE, AMYLASE, TSH, T4, T3, CRP, SED    Anesthesia Plan    ASA Status:  2      Anesthesia Type: Epidural.              Consents    Anesthesia Plan(s) and associated risks, benefits, and realistic alternatives discussed. Questions answered  and patient/representative(s) expressed understanding.    - Discussed:     - Discussed with:  Patient         Postoperative Care            Comments:           neg OB ROS.       Garland Segura MD

## 2023-04-01 ENCOUNTER — HEALTH MAINTENANCE LETTER (OUTPATIENT)
Age: 28
End: 2023-04-01

## 2024-06-01 ENCOUNTER — HEALTH MAINTENANCE LETTER (OUTPATIENT)
Age: 29
End: 2024-06-01

## 2024-06-17 PROBLEM — Z76.89 HEALTH CARE HOME: Status: RESOLVED | Noted: 2017-11-29 | Resolved: 2024-06-17

## 2025-06-14 ENCOUNTER — HEALTH MAINTENANCE LETTER (OUTPATIENT)
Age: 30
End: 2025-06-14